# Patient Record
Sex: FEMALE | Race: WHITE | Employment: OTHER | URBAN - METROPOLITAN AREA
[De-identification: names, ages, dates, MRNs, and addresses within clinical notes are randomized per-mention and may not be internally consistent; named-entity substitution may affect disease eponyms.]

---

## 2021-05-18 PROBLEM — G25.81 RLS (RESTLESS LEGS SYNDROME): Status: ACTIVE | Noted: 2021-05-18

## 2021-05-18 PROBLEM — I10 ESSENTIAL HYPERTENSION: Status: ACTIVE | Noted: 2021-05-18

## 2021-06-24 PROBLEM — M15.9 PRIMARY OSTEOARTHRITIS INVOLVING MULTIPLE JOINTS: Status: ACTIVE | Noted: 2021-06-24

## 2021-06-24 PROBLEM — I25.10 CORONARY ARTERY DISEASE INVOLVING NATIVE CORONARY ARTERY OF NATIVE HEART WITHOUT ANGINA PECTORIS: Status: ACTIVE | Noted: 2021-06-24

## 2021-06-24 PROBLEM — L85.3 DRY SKIN: Status: ACTIVE | Noted: 2021-06-24

## 2021-06-24 PROBLEM — D50.9 IRON DEFICIENCY ANEMIA: Status: ACTIVE | Noted: 2021-06-24

## 2021-09-17 PROBLEM — K59.00 CONSTIPATION: Status: ACTIVE | Noted: 2021-09-17

## 2021-09-17 PROBLEM — R33.9 URINARY RETENTION: Status: ACTIVE | Noted: 2021-09-17

## 2021-10-14 PROBLEM — E78.00 HYPERCHOLESTEROLEMIA: Status: ACTIVE | Noted: 2021-10-14

## 2021-10-14 PROBLEM — S22.000A CLOSED WEDGE FRACTURE OF THORACIC VERTEBRA (HCC): Status: ACTIVE | Noted: 2018-04-09

## 2021-10-18 ENCOUNTER — TELEPHONE (OUTPATIENT)
Dept: UROLOGY | Age: 86
End: 2021-10-18

## 2021-10-18 RX ORDER — CEFDINIR 300 MG/1
300 CAPSULE ORAL 2 TIMES DAILY
Qty: 20 CAPSULE | Refills: 0 | DISCHARGE
Start: 2021-10-18 | End: 2021-10-28

## 2021-10-18 NOTE — TELEPHONE ENCOUNTER
Urine culture is positive for infection.   Fax order for cefdinir 300 mg by mouth twice per day for 10 days

## 2022-01-20 PROBLEM — I11.0 HYPERTENSIVE HEART DISEASE WITH CONGESTIVE HEART FAILURE, UNSPECIFIED HEART FAILURE TYPE (HCC): Status: ACTIVE | Noted: 2022-01-20

## 2022-01-20 PROBLEM — F33.41 RECURRENT MAJOR DEPRESSIVE DISORDER, IN PARTIAL REMISSION (HCC): Status: ACTIVE | Noted: 2022-01-20

## 2022-10-07 PROBLEM — N95.2 VAGINAL ATROPHY: Status: ACTIVE | Noted: 2022-10-07

## 2022-10-07 PROBLEM — R33.9 INCOMPLETE BLADDER EMPTYING: Status: ACTIVE | Noted: 2022-10-07
